# Patient Record
Sex: FEMALE | Race: WHITE | NOT HISPANIC OR LATINO
[De-identification: names, ages, dates, MRNs, and addresses within clinical notes are randomized per-mention and may not be internally consistent; named-entity substitution may affect disease eponyms.]

---

## 2017-12-04 ENCOUNTER — RESULT REVIEW (OUTPATIENT)
Age: 52
End: 2017-12-04

## 2019-04-23 ENCOUNTER — RESULT REVIEW (OUTPATIENT)
Age: 54
End: 2019-04-23

## 2019-05-04 ENCOUNTER — RESULT REVIEW (OUTPATIENT)
Age: 54
End: 2019-05-04

## 2022-04-28 ENCOUNTER — NON-APPOINTMENT (OUTPATIENT)
Age: 57
End: 2022-04-28

## 2022-04-29 PROBLEM — Z00.00 ENCOUNTER FOR PREVENTIVE HEALTH EXAMINATION: Status: ACTIVE | Noted: 2022-04-29

## 2022-05-10 ENCOUNTER — APPOINTMENT (OUTPATIENT)
Dept: SURGICAL ONCOLOGY | Facility: CLINIC | Age: 57
End: 2022-05-10
Payer: COMMERCIAL

## 2022-05-10 VITALS
DIASTOLIC BLOOD PRESSURE: 69 MMHG | SYSTOLIC BLOOD PRESSURE: 114 MMHG | TEMPERATURE: 97.8 F | WEIGHT: 250 LBS | HEIGHT: 66 IN | BODY MASS INDEX: 40.18 KG/M2 | RESPIRATION RATE: 15 BRPM | OXYGEN SATURATION: 98 % | HEART RATE: 68 BPM

## 2022-05-10 DIAGNOSIS — Z80.3 FAMILY HISTORY OF MALIGNANT NEOPLASM OF BREAST: ICD-10-CM

## 2022-05-10 DIAGNOSIS — Z98.890 OTHER SPECIFIED POSTPROCEDURAL STATES: ICD-10-CM

## 2022-05-10 DIAGNOSIS — E78.5 HYPERLIPIDEMIA, UNSPECIFIED: ICD-10-CM

## 2022-05-10 DIAGNOSIS — Z85.820 PERSONAL HISTORY OF MALIGNANT MELANOMA OF SKIN: ICD-10-CM

## 2022-05-10 DIAGNOSIS — Z78.9 OTHER SPECIFIED HEALTH STATUS: ICD-10-CM

## 2022-05-10 DIAGNOSIS — I10 ESSENTIAL (PRIMARY) HYPERTENSION: ICD-10-CM

## 2022-05-10 DIAGNOSIS — Z80.42 FAMILY HISTORY OF MALIGNANT NEOPLASM OF PROSTATE: ICD-10-CM

## 2022-05-10 PROCEDURE — 99205 OFFICE O/P NEW HI 60 MIN: CPT

## 2022-05-10 RX ORDER — OLMESARTAN MEDOXOMIL 40 MG/1
40 TABLET, FILM COATED ORAL
Refills: 0 | Status: ACTIVE | COMMUNITY

## 2022-05-10 RX ORDER — AMLODIPINE BESYLATE 10 MG/1
10 TABLET ORAL
Refills: 0 | Status: ACTIVE | COMMUNITY

## 2022-05-10 RX ORDER — HYDROCHLOROTHIAZIDE 12.5 MG/1
12.5 CAPSULE ORAL
Refills: 0 | Status: ACTIVE | COMMUNITY

## 2022-05-10 RX ORDER — ROSUVASTATIN CALCIUM 5 MG/1
5 TABLET, FILM COATED ORAL
Refills: 0 | Status: ACTIVE | COMMUNITY

## 2022-05-10 NOTE — PHYSICAL EXAM
[Normal] : supple, no neck mass and thyroid not enlarged [Normal Neck Lymph Nodes] : normal neck lymph nodes  [Normal Supraclavicular Lymph Nodes] : normal supraclavicular lymph nodes [Normal Axillary Lymph Nodes] : normal axillary lymph nodes [Normal] : oriented to person, place and time, with appropriate affect [de-identified] : healed lumbar spine shave biopsy site with residual pigmentation; healed right upper back incision (prior melanoma site).

## 2022-05-10 NOTE — ASSESSMENT
[FreeTextEntry1] : Ms. ANNE-MARIE HUMPHREY  is a 56 year  old female who underwent a superior lumbar spine shave bx on 4/21/22 revealing a malignant melanoma, invasive superficial spreading type, Breslow thickness of 0.4 mm, no ulceration, regression present, mitotic index <1/mm2. No perineural invasion. The lesion extends to the peripheral edges and base. Tumor stage: pT1a. \par \par PLAN:\par 1) Wide local excision of lumbar spine T1 melanoma. We discussed the risks, benefits, and alternatives of the procedure with the patient, she expressed understanding and agree to proceed.\par 2) Plastic surgery

## 2022-05-10 NOTE — HISTORY OF PRESENT ILLNESS
[de-identified] : Ms. ANNE-MARIE HUMPHREY  is a 56 year  old female  presenting for an initial consultation for newly diagnosed lumbar spine melanoma, referred by Dr. Prieto Ricks. \par \par The patient states her boyfriend noticed a changing mole on her lower back which prompted her to f/u with dermatology. \par \par Pt. is s/p superior lumbar spine shave bx on 22- PATH:  Malignant melanoma, invasive superficial spreading type, Breslow thickness of 0.4 mm, no ulceration, regression present, mitotic index <1/mm2. No perineural invasion. The lesion extends to the peripheral edges and base. Tumor stage: pT1a. \par \par Ms. HUMPHREY reports a longstanding history of sun exposure without the use of sunblock. History of sunburns.  Denies any other concerning lesions or masses. Denies bleeding or pruritic moles. Denies pain or constitutional symptoms.\par \par PMH: HTN, HLD, right upper back T1 (0.25 mm) melanoma s/p WLE \par PSH: BELLA-BSO (benign), melanoma excision\par Family Hx: Sister with breast cancer, brother with prostate cancer \par Social Hx: Never a smoker, social alcohol use, lives with boyfriend, 1 son (), working. \par MEDS:  Hydrochlorothiazide 12.5 mg daily, Amlodipine 10 mg daily, Rosuvastatin 5 mg daily, Olmesartan  20 mg BID. \par NKDA

## 2022-05-10 NOTE — CONSULT LETTER
[Dear  ___] : Dear  [unfilled], [Consult Letter:] : I had the pleasure of evaluating your patient, [unfilled]. [Please see my note below.] : Please see my note below. [Consult Closing:] : Thank you very much for allowing me to participate in the care of this patient.  If you have any questions, please do not hesitate to contact me. [Sincerely,] : Sincerely, [FreeTextEntry3] : Jesse Crawford MD, MPH, FACS, FSSO\par , [facility] Surgical On[facility]\par [facility]\par Associate Professor of Surgery\par [employer] at [facility]  [DrSue  ___] : Dr. BEAR

## 2022-05-19 ENCOUNTER — OUTPATIENT (OUTPATIENT)
Dept: OUTPATIENT SERVICES | Facility: HOSPITAL | Age: 57
LOS: 1 days | End: 2022-05-19

## 2022-05-19 ENCOUNTER — RESULT REVIEW (OUTPATIENT)
Age: 57
End: 2022-05-19

## 2022-05-19 DIAGNOSIS — C80.1 MALIGNANT (PRIMARY) NEOPLASM, UNSPECIFIED: ICD-10-CM

## 2022-05-31 ENCOUNTER — OUTPATIENT (OUTPATIENT)
Dept: OUTPATIENT SERVICES | Facility: HOSPITAL | Age: 57
LOS: 1 days | End: 2022-05-31
Payer: COMMERCIAL

## 2022-05-31 VITALS
OXYGEN SATURATION: 98 % | SYSTOLIC BLOOD PRESSURE: 119 MMHG | TEMPERATURE: 98 F | HEART RATE: 70 BPM | RESPIRATION RATE: 16 BRPM | DIASTOLIC BLOOD PRESSURE: 60 MMHG | HEIGHT: 67 IN | WEIGHT: 259.93 LBS

## 2022-05-31 DIAGNOSIS — E78.5 HYPERLIPIDEMIA, UNSPECIFIED: ICD-10-CM

## 2022-05-31 DIAGNOSIS — D64.9 ANEMIA, UNSPECIFIED: ICD-10-CM

## 2022-05-31 DIAGNOSIS — Z01.818 ENCOUNTER FOR OTHER PREPROCEDURAL EXAMINATION: ICD-10-CM

## 2022-05-31 DIAGNOSIS — Z29.9 ENCOUNTER FOR PROPHYLACTIC MEASURES, UNSPECIFIED: ICD-10-CM

## 2022-05-31 DIAGNOSIS — E66.9 OBESITY, UNSPECIFIED: ICD-10-CM

## 2022-05-31 DIAGNOSIS — Z90.710 ACQUIRED ABSENCE OF BOTH CERVIX AND UTERUS: Chronic | ICD-10-CM

## 2022-05-31 DIAGNOSIS — Z91.89 OTHER SPECIFIED PERSONAL RISK FACTORS, NOT ELSEWHERE CLASSIFIED: ICD-10-CM

## 2022-05-31 DIAGNOSIS — I10 ESSENTIAL (PRIMARY) HYPERTENSION: ICD-10-CM

## 2022-05-31 DIAGNOSIS — C43.59 MALIGNANT MELANOMA OF OTHER PART OF TRUNK: ICD-10-CM

## 2022-05-31 DIAGNOSIS — Z98.890 OTHER SPECIFIED POSTPROCEDURAL STATES: Chronic | ICD-10-CM

## 2022-05-31 LAB
A1C WITH ESTIMATED AVERAGE GLUCOSE RESULT: 6.1 % — HIGH (ref 4–5.6)
ALBUMIN SERPL ELPH-MCNC: 4.5 G/DL — SIGNIFICANT CHANGE UP (ref 3.3–5.2)
ALP SERPL-CCNC: 66 U/L — SIGNIFICANT CHANGE UP (ref 40–120)
ALT FLD-CCNC: 18 U/L — SIGNIFICANT CHANGE UP
ANION GAP SERPL CALC-SCNC: 11 MMOL/L — SIGNIFICANT CHANGE UP (ref 5–17)
APTT BLD: 32 SEC — SIGNIFICANT CHANGE UP (ref 27.5–35.5)
AST SERPL-CCNC: 17 U/L — SIGNIFICANT CHANGE UP
BASOPHILS # BLD AUTO: 0.06 K/UL — SIGNIFICANT CHANGE UP (ref 0–0.2)
BASOPHILS NFR BLD AUTO: 0.6 % — SIGNIFICANT CHANGE UP (ref 0–2)
BILIRUB SERPL-MCNC: 0.3 MG/DL — LOW (ref 0.4–2)
BLD GP AB SCN SERPL QL: SIGNIFICANT CHANGE UP
BUN SERPL-MCNC: 15.1 MG/DL — SIGNIFICANT CHANGE UP (ref 8–20)
CALCIUM SERPL-MCNC: 9.3 MG/DL — SIGNIFICANT CHANGE UP (ref 8.6–10.2)
CHLORIDE SERPL-SCNC: 102 MMOL/L — SIGNIFICANT CHANGE UP (ref 98–107)
CO2 SERPL-SCNC: 30 MMOL/L — HIGH (ref 22–29)
CREAT SERPL-MCNC: 0.7 MG/DL — SIGNIFICANT CHANGE UP (ref 0.5–1.3)
EGFR: 103 ML/MIN/1.73M2 — SIGNIFICANT CHANGE UP
EOSINOPHIL # BLD AUTO: 0.27 K/UL — SIGNIFICANT CHANGE UP (ref 0–0.5)
EOSINOPHIL NFR BLD AUTO: 2.6 % — SIGNIFICANT CHANGE UP (ref 0–6)
ESTIMATED AVERAGE GLUCOSE: 128 MG/DL — HIGH (ref 68–114)
GLUCOSE SERPL-MCNC: 116 MG/DL — HIGH (ref 70–99)
HCT VFR BLD CALC: 41.5 % — SIGNIFICANT CHANGE UP (ref 34.5–45)
HGB BLD-MCNC: 13.3 G/DL — SIGNIFICANT CHANGE UP (ref 11.5–15.5)
IMM GRANULOCYTES NFR BLD AUTO: 0.3 % — SIGNIFICANT CHANGE UP (ref 0–1.5)
INR BLD: 0.99 RATIO — SIGNIFICANT CHANGE UP (ref 0.88–1.16)
LYMPHOCYTES # BLD AUTO: 1.78 K/UL — SIGNIFICANT CHANGE UP (ref 1–3.3)
LYMPHOCYTES # BLD AUTO: 17.1 % — SIGNIFICANT CHANGE UP (ref 13–44)
MCHC RBC-ENTMCNC: 28.4 PG — SIGNIFICANT CHANGE UP (ref 27–34)
MCHC RBC-ENTMCNC: 32 GM/DL — SIGNIFICANT CHANGE UP (ref 32–36)
MCV RBC AUTO: 88.7 FL — SIGNIFICANT CHANGE UP (ref 80–100)
MONOCYTES # BLD AUTO: 0.63 K/UL — SIGNIFICANT CHANGE UP (ref 0–0.9)
MONOCYTES NFR BLD AUTO: 6 % — SIGNIFICANT CHANGE UP (ref 2–14)
NEUTROPHILS # BLD AUTO: 7.65 K/UL — HIGH (ref 1.8–7.4)
NEUTROPHILS NFR BLD AUTO: 73.4 % — SIGNIFICANT CHANGE UP (ref 43–77)
PLATELET # BLD AUTO: 256 K/UL — SIGNIFICANT CHANGE UP (ref 150–400)
POTASSIUM SERPL-MCNC: 4.1 MMOL/L — SIGNIFICANT CHANGE UP (ref 3.5–5.3)
POTASSIUM SERPL-SCNC: 4.1 MMOL/L — SIGNIFICANT CHANGE UP (ref 3.5–5.3)
PROT SERPL-MCNC: 7.6 G/DL — SIGNIFICANT CHANGE UP (ref 6.6–8.7)
PROTHROM AB SERPL-ACNC: 11.5 SEC — SIGNIFICANT CHANGE UP (ref 10.5–13.4)
RBC # BLD: 4.68 M/UL — SIGNIFICANT CHANGE UP (ref 3.8–5.2)
RBC # FLD: 14.5 % — SIGNIFICANT CHANGE UP (ref 10.3–14.5)
SODIUM SERPL-SCNC: 143 MMOL/L — SIGNIFICANT CHANGE UP (ref 135–145)
WBC # BLD: 10.42 K/UL — SIGNIFICANT CHANGE UP (ref 3.8–10.5)
WBC # FLD AUTO: 10.42 K/UL — SIGNIFICANT CHANGE UP (ref 3.8–10.5)

## 2022-05-31 PROCEDURE — 93005 ELECTROCARDIOGRAM TRACING: CPT

## 2022-05-31 PROCEDURE — 93010 ELECTROCARDIOGRAM REPORT: CPT

## 2022-05-31 PROCEDURE — G0463: CPT

## 2022-05-31 NOTE — H&P PST ADULT - NSANTHOSAYNRD_GEN_A_CORE
No. JOSEE screening performed.  STOP BANG Legend: 0-2 = LOW Risk; 3-4 = INTERMEDIATE Risk; 5-8 = HIGH Risk

## 2022-05-31 NOTE — H&P PST ADULT - SCARS
upper mid back healed scar, right lower lateral back healed scar, left superior lumbar spine erythema and brown spot noted, no s&s infection exudate, bleeding, pain/location

## 2022-05-31 NOTE — H&P PST ADULT - ATTENDING COMMENTS
Risks, benefits, and alternatives discussed with the patient - she expressed understanding and agrees to proceed with wide local excision of superior lumbar spine melanoma.

## 2022-05-31 NOTE — H&P PST ADULT - NSICDXPASTMEDICALHX_GEN_ALL_CORE_FT
PAST MEDICAL HISTORY:  2019 novel coronavirus disease (COVID-19) 12/2020    Anemia     COVID-19 vaccine series completed     Hyperlipidemia     Hypertension     Malignant melanoma of trunk     Obesity      PAST MEDICAL HISTORY:  2019 novel coronavirus disease (COVID-19) 12/2020    Anemia     COVID-19 vaccine series completed     Heavy menstrual bleeding     Hyperlipidemia     Hypertension     Malignant melanoma of trunk     Obesity     S/P skin cancer resection back

## 2022-05-31 NOTE — H&P PST ADULT - PROBLEM SELECTOR PLAN 7
verbal cues Stop Bang = 4. Surgical team to follow. Medical clearance pending, PCP for medical management and follow up as indicated.

## 2022-05-31 NOTE — H&P PST ADULT - LAB RESULTS AND INTERPRETATION
Labs pending Labs pending  5/31/22 10:52 All available labs noted as documented. All abnormal labs noted as documented and have been faxed to PCP with whom medical clearance is pending, T&S pending, A1C added on and pending, spoke to lab. Amy MS, FNP-BC

## 2022-05-31 NOTE — H&P PST ADULT - PROBLEM SELECTOR PLAN 6
Medical clearance pending for  wide local excision of superior lumbar spine melanoma prone position on 6/20/22 with Dr. Jesse Crawford secondary to malignant melanoma of other part of trunk.

## 2022-05-31 NOTE — H&P PST ADULT - ASSESSMENT
57 y/o female presents today pending wide local excision of superior lumbar spine melanoma prone position on 22 with Dr. Jesse Crawford secondary to malignant melanoma of other part of trunk. Pt. with history of skin cancer, states MOHS procedure done 20 years ago on her back. Pt. with history of obesity, HTN, HLD, anemia.  States boyfriend was scratching her back and noticed an abnormal mole on her back, denies pain, states biopsy recently done last month which revealed melanoma pt. states site is itchy as it is healing.  BMI 40.7. BP controlled.    Pt. to have medical clearance with Dr. Galo Glass, pt. aware she is to schedule appt. and pt. verbalized agreement and understanding.   Patient educated on surgical scrub, COVID testing-pt. aware to contact surgeon's office per policy to schedule COVID PCR 3 days pre op. preadmission instructions, medical clearance and day of procedure medications as per policy and pt. verbalizes understanding and agreement.  Pt. educated and instructed regarding all preoperative instructions and education as per policy via both verbal and written means of communication and pt. verbalized agreement and understanding.  Pt instructed to stop vitamins/supplements/herbal medications/NSAIDS for one week prior to surgery and pt. verbalized agreement and understanding.    OPIOID RISK TOOL    ALYSHA EACH BOX THAT APPLIES AND ADD TOTALS AT THE END    FAMILY HISTORY OF SUBSTANCE ABUSE                 FEMALE         MALE                                                Alcohol                             [  ]1 pt          [  ]3pts                                               Illegal Durgs                     [  ]2 pts        [  ]3pts                                               Rx Drugs                           [  ]4 pts        [  ]4 pts    PERSONAL HISTORY OF SUBSTANCE ABUSE                                                                                          Alcohol                             [  ]3 pts       [  ]3 pts                                               Illegal Drugs                     [  ]4 pts        [  ]4 pts                                               Rx Drugs                           [  ]5 pts        [  ]5 pts    AGE BETWEEN 16-45 YEARS                                      [  ]1 pt         [  ]1 pt    HISTORY OF PREADOLESCENT   SEXUAL ABUSE                                                             [  ]3 pts        [  ]0pts    PSYCHOLOGICAL DISEASE                     ADD, OCD, Bipolar, Schizophrenia        [  ]2 pts         [  ]2 pts                      Depression                                               [  ]1 pt           [  ]1 pt           SCORING TOTAL   (add numbers and type here)              (0)                                     A score of 3 or lower indicated LOW risk for future opioid abuse  A score of 4 to 7 indicated moderate risk for future opioid abuse  A score of 8 or higher indicates a high risk for opioid abuse      CAPRINI SCORE    AGE RELATED RISK FACTORS                                                             [ x] Age 41-60 years                                            (1 Point)  [ ] Age: 61-74 years                                           (2 Points)                 [ ] Age= 75 years                                                (3 Points)             DISEASE RELATED RISK FACTORS                                                       [ ] Edema in the lower extremities                 (1 Point)                     [ ] Varicose veins                                               (1 Point)                                 [ x] BMI > 25 Kg/m2                                            (1 Point)                                  [ ] Serious infection (ie PNA)                            (1 Point)                     [ ] Lung disease ( COPD, Emphysema)            (1 Point)                                                                          [ ] Acute myocardial infarction                         (1 Point)                  [ ] Congestive heart failure (in the previous month)  (1 Point)         [ ] Inflammatory bowel disease                            (1 Point)                  [ ] Central venous access, PICC or Port               (2 points)       (within the last month)                                                                [ ] Stroke (in the previous month)                        (5 Points)    [x ] Previous or present malignancy                       (2 points)                                                                                                                                                         HEMATOLOGY RELATED FACTORS                                                         [ ] Prior episodes of VTE                                     (3 Points)                     [ ] Positive family history for VTE                      (3 Points)                  [ ] Prothrombin 96980 A                                     (3 Points)                     [ ] Factor V Leiden                                                (3 Points)                        [ ] Lupus anticoagulants                                      (3 Points)                                                           [ ] Anticardiolipin antibodies                              (3 Points)                                                       [ ] High homocysteine in the blood                   (3 Points)                                             [ ] Other congenital or acquired thrombophilia      (3 Points)                                                [ ] Heparin induced thrombocytopenia                  (3 Points)                                        MOBILITY RELATED FACTORS  [ ] Bed rest                                                         (1 Point)  [ ] Plaster cast                                                    (2 points)  [ ] Bed bound for more than 72 hours           (2 Points)    GENDER SPECIFIC FACTORS  [ ] Pregnancy or had a baby within the last month   (1 Point)  [ ] Post-partum < 6 weeks                                   (1 Point)  [ ] Hormonal therapy  or oral contraception   (1 Point)  [ ] History of pregnancy complications              (1 point)  [ ] Unexplained or recurrent              (1 Point)    OTHER RISK FACTORS                                           (1 Point)  [x ] BMI >40, smoking, diabetes requiring insulin, chemotherapy  blood transfusions and length of surgery over 2 hours    SURGERY RELATED RISK FACTORS  [ ]  Section within the last month     (1 Point)  [ ] Minor surgery                                                  (1 Point)  [ ] Arthroscopic surgery                                       (2 Points)  [x ] Planned major surgery lasting more            (2 Points)      than 45 minutes     [ ] Elective hip or knee joint replacement       (5 points)       surgery                                                TRAUMA RELATED RISK FACTORS  [ ] Fracture of the hip, pelvis, or leg                       (5 Points)  [ ] Spinal cord injury resulting in paralysis             (5 points)       (in the previous month)    [ ] Paralysis  (less than 1 month)                             (5 Points)  [ ] Multiple Trauma within 1 month                        (5 Points)    Total Score [    7    ]    Caprini Score 0-2: Low Risk, NO VTE prophylaxis required for most patients, encourage ambulation  Caprini Score 3-6: Moderate Risk , pharmacologic VTE prophylaxis is indicated for most patients (in the absence of contraindications)  Caprini Score Greater than or =7: High risk, pharmocologic VTE prophylaxis indicated for most patients (in the absence of contraindications)

## 2022-05-31 NOTE — H&P PST ADULT - HISTORY OF PRESENT ILLNESS
55 y/o female presents today pending wide local excision of superior lumbar spine melanoma prone position on 6/20/22 with Dr. Jesse rCawford secondary to malignant melanoma of other part of trunk. Pt. with history of skin cancer, states MOHS procedure done 20 years ago on her back. Pt. with history of obesity, HTN, HLD, anemia.  States boyfriend was scratching her back and noticed an abnormal mole on her back, denies pain, states biopsy recently done last month which revealed melanoma pt. states site is itchy as it is healing.

## 2022-05-31 NOTE — H&P PST ADULT - NSICDXPASTSURGICALHX_GEN_ALL_CORE_FT
PAST SURGICAL HISTORY:  S/P hysterectomy     Status post Mohs surgery back     PAST SURGICAL HISTORY:  S/P hysterectomy 2020    Status post Mohs surgery back

## 2022-05-31 NOTE — H&P PST ADULT - EKG AND INTERPRETATION
EKG Sinus Rhythm with occasional PVCs 69 BPM pending medical clearance and final cardiac interpretation

## 2022-05-31 NOTE — H&P PST ADULT - NSICDXFAMILYHX_GEN_ALL_CORE_FT
FAMILY HISTORY:  Father  Still living? No  FH: heart disease, Age at diagnosis: Age Unknown    Sibling  Still living? No  FH: breast cancer, Age at diagnosis: Age Unknown

## 2022-06-02 LAB — SURGICAL PATHOLOGY STUDY: SIGNIFICANT CHANGE UP

## 2022-06-17 NOTE — ASU PATIENT PROFILE, ADULT - NSICDXPASTMEDICALHX_GEN_ALL_CORE_FT
PAST MEDICAL HISTORY:  2019 novel coronavirus disease (COVID-19) 12/2020    Anemia     COVID-19 vaccine series completed     Heavy menstrual bleeding     Hyperlipidemia     Hypertension     Malignant melanoma of trunk     Obesity     S/P skin cancer resection back

## 2022-06-19 ENCOUNTER — TRANSCRIPTION ENCOUNTER (OUTPATIENT)
Age: 57
End: 2022-06-19

## 2022-06-20 ENCOUNTER — TRANSCRIPTION ENCOUNTER (OUTPATIENT)
Age: 57
End: 2022-06-20

## 2022-06-20 ENCOUNTER — OUTPATIENT (OUTPATIENT)
Dept: INPATIENT UNIT | Facility: HOSPITAL | Age: 57
LOS: 1 days | End: 2022-06-20
Payer: COMMERCIAL

## 2022-06-20 ENCOUNTER — RESULT REVIEW (OUTPATIENT)
Age: 57
End: 2022-06-20

## 2022-06-20 ENCOUNTER — APPOINTMENT (OUTPATIENT)
Dept: SURGICAL ONCOLOGY | Facility: HOSPITAL | Age: 57
End: 2022-06-20

## 2022-06-20 VITALS
DIASTOLIC BLOOD PRESSURE: 69 MMHG | OXYGEN SATURATION: 96 % | RESPIRATION RATE: 16 BRPM | HEART RATE: 69 BPM | SYSTOLIC BLOOD PRESSURE: 127 MMHG | WEIGHT: 259.93 LBS | TEMPERATURE: 99 F | HEIGHT: 67 IN

## 2022-06-20 VITALS
HEART RATE: 53 BPM | DIASTOLIC BLOOD PRESSURE: 55 MMHG | SYSTOLIC BLOOD PRESSURE: 110 MMHG | RESPIRATION RATE: 14 BRPM | OXYGEN SATURATION: 97 % | TEMPERATURE: 98 F

## 2022-06-20 DIAGNOSIS — Z90.710 ACQUIRED ABSENCE OF BOTH CERVIX AND UTERUS: Chronic | ICD-10-CM

## 2022-06-20 DIAGNOSIS — C43.59 MALIGNANT MELANOMA OF OTHER PART OF TRUNK: ICD-10-CM

## 2022-06-20 DIAGNOSIS — Z98.890 OTHER SPECIFIED POSTPROCEDURAL STATES: Chronic | ICD-10-CM

## 2022-06-20 LAB
BLD GP AB SCN SERPL QL: SIGNIFICANT CHANGE UP
SARS-COV-2 N GENE NPH QL NAA+PROBE: NOT DETECTED

## 2022-06-20 PROCEDURE — 86850 RBC ANTIBODY SCREEN: CPT

## 2022-06-20 PROCEDURE — 14302 TIS TRNFR ADDL 30 SQ CM: CPT

## 2022-06-20 PROCEDURE — 36415 COLL VENOUS BLD VENIPUNCTURE: CPT

## 2022-06-20 PROCEDURE — 88305 TISSUE EXAM BY PATHOLOGIST: CPT | Mod: 26

## 2022-06-20 PROCEDURE — 88305 TISSUE EXAM BY PATHOLOGIST: CPT

## 2022-06-20 PROCEDURE — 86901 BLOOD TYPING SEROLOGIC RH(D): CPT

## 2022-06-20 PROCEDURE — 86900 BLOOD TYPING SEROLOGIC ABO: CPT

## 2022-06-20 PROCEDURE — 14301 TIS TRNFR ANY 30.1-60 SQ CM: CPT

## 2022-06-20 PROCEDURE — 11604 EXC TR-EXT MAL+MARG 3.1-4 CM: CPT

## 2022-06-20 DEVICE — CLIP APPLIER COVIDIEN SURGICLIP 11.5" MEDIUM: Type: IMPLANTABLE DEVICE | Status: FUNCTIONAL

## 2022-06-20 RX ORDER — IBUPROFEN 200 MG
1 TABLET ORAL
Qty: 20 | Refills: 0
Start: 2022-06-20 | End: 2022-06-24

## 2022-06-20 RX ORDER — HYDROMORPHONE HYDROCHLORIDE 2 MG/ML
0.5 INJECTION INTRAMUSCULAR; INTRAVENOUS; SUBCUTANEOUS
Refills: 0 | Status: DISCONTINUED | OUTPATIENT
Start: 2022-06-20 | End: 2022-06-20

## 2022-06-20 RX ORDER — AMLODIPINE BESYLATE 2.5 MG/1
1 TABLET ORAL
Qty: 0 | Refills: 0 | DISCHARGE

## 2022-06-20 RX ORDER — SODIUM CHLORIDE 9 MG/ML
1000 INJECTION, SOLUTION INTRAVENOUS
Refills: 0 | Status: DISCONTINUED | OUTPATIENT
Start: 2022-06-20 | End: 2022-06-20

## 2022-06-20 RX ORDER — ROSUVASTATIN CALCIUM 5 MG/1
1 TABLET ORAL
Qty: 0 | Refills: 0 | DISCHARGE

## 2022-06-20 RX ORDER — SODIUM CHLORIDE 9 MG/ML
3 INJECTION INTRAMUSCULAR; INTRAVENOUS; SUBCUTANEOUS ONCE
Refills: 0 | Status: DISCONTINUED | OUTPATIENT
Start: 2022-06-20 | End: 2022-06-20

## 2022-06-20 RX ORDER — FENTANYL CITRATE 50 UG/ML
50 INJECTION INTRAVENOUS
Refills: 0 | Status: DISCONTINUED | OUTPATIENT
Start: 2022-06-20 | End: 2022-06-20

## 2022-06-20 RX ORDER — CEFAZOLIN SODIUM 1 G
2000 VIAL (EA) INJECTION ONCE
Refills: 0 | Status: COMPLETED | OUTPATIENT
Start: 2022-06-20 | End: 2022-06-20

## 2022-06-20 RX ORDER — ACETAMINOPHEN 500 MG
1 TABLET ORAL
Qty: 20 | Refills: 0
Start: 2022-06-20 | End: 2022-06-24

## 2022-06-20 RX ORDER — ACETAMINOPHEN 500 MG
975 TABLET ORAL ONCE
Refills: 0 | Status: COMPLETED | OUTPATIENT
Start: 2022-06-20 | End: 2022-06-20

## 2022-06-20 RX ORDER — OLMESARTAN MEDOXOMIL 5 MG/1
2 TABLET, FILM COATED ORAL
Qty: 0 | Refills: 0 | DISCHARGE

## 2022-06-20 RX ADMIN — Medication 975 MILLIGRAM(S): at 06:37

## 2022-06-20 RX ADMIN — Medication 100 MILLIGRAM(S): at 07:50

## 2022-06-20 NOTE — ASU DISCHARGE PLAN (ADULT/PEDIATRIC) - PROVIDER TOKENS
PROVIDER:[TOKEN:[[id number]],FOLLOWUP:[2 weeks]],PROVIDER:[TOKEN:[[id number]],FOLLOWUP:[2 weeks]]

## 2022-06-20 NOTE — BRIEF OPERATIVE NOTE - OPERATION/FINDINGS
1 cm margins marked, local anesthetic injected, elliptical incision made, specimen extracted using Ligasure, rhomboid flap closure by plastic surgery
Closure of lumbar wound with rhomboid flap 8x10cm

## 2022-06-20 NOTE — ASU PREOP CHECKLIST - SIDE RAILS UP
Agency/Facility Name: [facility]  Outcome: Resent referral via Epic and also manually.     done

## 2022-06-20 NOTE — ASU DISCHARGE PLAN (ADULT/PEDIATRIC) - BRAND OF FIRST COVID-19 BOOSTER
Called, spoke to pt. Two pt identifiers confirmed. Pt informed per Dr. Amirah Barrett anemia stable, normal iron stores. Pt informed per Dr. Amirah Barrett likely anemia related to recent prolonged illness, will monitor  PT voiced interest about most recent metabolic panel. Labs faxed to Dr. Paulina Agosto per pt request.  Pt verbalized understanding of information discussed w/ no further questions at this time.
Let him know anemia stable  Normal iron stores    Likely anemia related to recent prolonged illness    Will monitor
Santa Williamson, labs ordered.
Moderna

## 2022-06-20 NOTE — BRIEF OPERATIVE NOTE - NSICDXBRIEFPREOP_GEN_ALL_CORE_FT
PRE-OP DIAGNOSIS:  Melanoma of back 20-Jun-2022 09:05:38  Arnoldo Ortega A  
PRE-OP DIAGNOSIS:  Melanoma of back 20-Jun-2022 09:05:38  Arnoldo Ortega A

## 2022-06-20 NOTE — BRIEF OPERATIVE NOTE - NSICDXBRIEFPROCEDURE_GEN_ALL_CORE_FT
PROCEDURES:  Excision, neoplasm, malignant, back, 2.1 cm to 3.0 cm in diameter 20-Jun-2022 09:22:26  Aguila Lynn  
PROCEDURES:  Adjacent tissue transfer, trunk, defect 10.1 to 30.0 sq cm 20-Jun-2022 09:05:19  Arnoldo Ortega A

## 2022-06-20 NOTE — ASU DISCHARGE PLAN (ADULT/PEDIATRIC) - CARE PROVIDER_API CALL
Jesse Crawford)  Complex General Surgical Oncology; Surgery; Surgical Oncology  [address] [place] [address]  Phone: [phone]  Fax: [phone]  Follow Up Time: 2 weeks    Arnoldo Ortega)  Plastic Surgery; Surgery of the Hand  [address], [address] [place] [address]  Phone: [phone]  Fax: [phone]  Follow Up Time: 2 weeks

## 2022-06-22 PROBLEM — U07.1 COVID-19: Chronic | Status: ACTIVE | Noted: 2022-05-31

## 2022-06-22 PROBLEM — E78.5 HYPERLIPIDEMIA, UNSPECIFIED: Chronic | Status: ACTIVE | Noted: 2022-05-31

## 2022-06-22 PROBLEM — C43.59 MALIGNANT MELANOMA OF OTHER PART OF TRUNK: Chronic | Status: ACTIVE | Noted: 2022-05-31

## 2022-06-22 PROBLEM — Z92.29 PERSONAL HISTORY OF OTHER DRUG THERAPY: Chronic | Status: ACTIVE | Noted: 2022-05-31

## 2022-06-22 PROBLEM — N92.0 EXCESSIVE AND FREQUENT MENSTRUATION WITH REGULAR CYCLE: Chronic | Status: ACTIVE | Noted: 2022-05-31

## 2022-06-22 PROBLEM — Z98.890 OTHER SPECIFIED POSTPROCEDURAL STATES: Chronic | Status: ACTIVE | Noted: 2022-05-31

## 2022-06-22 PROBLEM — I10 ESSENTIAL (PRIMARY) HYPERTENSION: Chronic | Status: ACTIVE | Noted: 2022-05-31

## 2022-06-22 PROBLEM — E66.9 OBESITY, UNSPECIFIED: Chronic | Status: ACTIVE | Noted: 2022-05-31

## 2022-06-22 PROBLEM — D64.9 ANEMIA, UNSPECIFIED: Chronic | Status: ACTIVE | Noted: 2022-05-31

## 2022-07-06 LAB — SURGICAL PATHOLOGY STUDY: SIGNIFICANT CHANGE UP

## 2022-07-07 ENCOUNTER — APPOINTMENT (OUTPATIENT)
Dept: SURGICAL ONCOLOGY | Facility: CLINIC | Age: 57
End: 2022-07-07

## 2022-07-14 ENCOUNTER — APPOINTMENT (OUTPATIENT)
Dept: SURGICAL ONCOLOGY | Facility: CLINIC | Age: 57
End: 2022-07-14

## 2022-07-14 VITALS
WEIGHT: 265 LBS | DIASTOLIC BLOOD PRESSURE: 79 MMHG | HEIGHT: 67 IN | BODY MASS INDEX: 41.59 KG/M2 | OXYGEN SATURATION: 95 % | SYSTOLIC BLOOD PRESSURE: 118 MMHG | TEMPERATURE: 97.7 F | HEART RATE: 78 BPM

## 2022-07-14 PROCEDURE — 99213 OFFICE O/P EST LOW 20 MIN: CPT

## 2022-07-15 NOTE — CONSULT LETTER
[Dear  ___] : Dear  [unfilled], [Consult Letter:] : I had the pleasure of evaluating your patient, [unfilled]. [Please see my note below.] : Please see my note below. [Consult Closing:] : Thank you very much for allowing me to participate in the care of this patient.  If you have any questions, please do not hesitate to contact me. [Sincerely,] : Sincerely, [FreeTextEntry3] : Jesse Crawford MD, MPH, FACS, FSSO\par , [facility] General Surgical Oncology Fellowship\par [facility]\par Associate Professor of Surgery\par [employer] at [facility]  [DrSue  ___] : Dr. BEAR

## 2022-07-15 NOTE — ASSESSMENT
[FreeTextEntry1] : Ms. ANNE-MARIE HUMPHREY  is a 56 year  old female who underwent a superior lumbar spine shave bx on 4/21/22 revealing a malignant melanoma, invasive superficial spreading type, Breslow thickness of 0.4 mm, no ulceration, regression present, mitotic index <1/mm2. No perineural invasion. The lesion extends to the peripheral edges and base. Tumor stage: pT1a. \par \par She is now status post wide excision of left lumbar back melanoma on 6/20/22.  Plastic reconstruction by Dr. Ortega.   Pathology :Melanoma in situ, residual, margins negative\par \par PLAN:\par 1) Follow up with Derm for full body checks\par 2) RTO in 3 months

## 2022-07-15 NOTE — HISTORY OF PRESENT ILLNESS
[de-identified] : Ms. ANNE-MARIE HUMPHREY  is a 56 year  old female  presenting for an initial postop visit.\par \par She was initially seen in consultation on 5/10/22 for newly diagnosed lumbar spine melanoma, referred by Dr. Prieto Ricks. \par \par The patient states her boyfriend noticed a changing mole on her lower back which prompted her to f/u with dermatology. \par \par Pt. is s/p superior lumbar spine shave bx on 22- PATH:  Malignant melanoma, invasive superficial spreading type, Breslow thickness of 0.4 mm, no ulceration, regression present, mitotic index <1/mm2. No perineural invasion. The lesion extends to the peripheral edges and base. Tumor stage: pT1a. \par \par Ms. HUMPHREY reports a longstanding history of sun exposure without the use of sunblock. History of sunburns.  Denies any other concerning lesions or masses. Denies bleeding or pruritic moles. Denies pain or constitutional symptoms.\par \par PMH: HTN, HLD, right upper back T1 (0.25 mm) melanoma s/p WLE \par PSH: BELLA-BSO (benign), melanoma excision\par Family Hx: Sister with breast cancer, brother with prostate cancer \par Social Hx: Never a smoker, social alcohol use, lives with boyfriend, 1 son (), working. \par MEDS:  Hydrochlorothiazide 12.5 mg daily, Amlodipine 10 mg daily, Rosuvastatin 5 mg daily, Olmesartan  20 mg BID. \par NKDA \par \par \par INTERVAL HISTORY:\par \par ***SURGERY: status post wide excision of left lumbar back melanoma on 22.  Plastic reconstruction by Dr. Ortega\par ***PATHOLOGY:  Melanoma in situ, residual, margins negative\par \par 22- Pt is feeling well, Incision on left back is healed without evidence of infection or recurrence

## 2022-07-15 NOTE — PHYSICAL EXAM
[de-identified] : Back incision is clean, dry, and intact without evidence of infection, seroma, or hematoma

## 2022-10-12 PROBLEM — C43.59 MELANOMA OF BACK: Status: ACTIVE | Noted: 2022-05-10

## 2022-10-13 ENCOUNTER — APPOINTMENT (OUTPATIENT)
Dept: SURGICAL ONCOLOGY | Facility: CLINIC | Age: 57
End: 2022-10-13

## 2022-10-13 VITALS
BODY MASS INDEX: 40.81 KG/M2 | DIASTOLIC BLOOD PRESSURE: 73 MMHG | HEIGHT: 67 IN | RESPIRATION RATE: 16 BRPM | HEART RATE: 63 BPM | SYSTOLIC BLOOD PRESSURE: 140 MMHG | OXYGEN SATURATION: 95 % | TEMPERATURE: 97.4 F | WEIGHT: 260 LBS

## 2022-10-13 DIAGNOSIS — C43.59 MALIGNANT MELANOMA OF OTHER PART OF TRUNK: ICD-10-CM

## 2022-10-13 PROCEDURE — 99213 OFFICE O/P EST LOW 20 MIN: CPT

## 2022-12-23 NOTE — CONSULT LETTER
[Dear  ___] : Dear  [unfilled], [Consult Letter:] : I had the pleasure of evaluating your patient, [unfilled]. [Please see my note below.] : Please see my note below. [Consult Closing:] : Thank you very much for allowing me to participate in the care of this patient.  If you have any questions, please do not hesitate to contact me. [Sincerely,] : Sincerely, [FreeTextEntry3] : Jesse Crawford MD, MPH, FACS, FSSO\par , [facility] General Surgical Oncology Fellowship\par [facility]\par Associate Professor of Surgery\par [employer] at [facility]  [DrSue  ___] : Dr. BEAR

## 2022-12-23 NOTE — REASON FOR VISIT
[Follow-Up Visit] : a follow-up visit for [FreeTextEntry2] : status post wide excision of left lumbar back melanoma on 6/20/22

## 2022-12-23 NOTE — HISTORY OF PRESENT ILLNESS
[de-identified] : Ms. ANNE-MARIE HUMPHREY  is a 56 year  old female  presenting for a follow up visit. \par \par She was initially seen in consultation on 5/10/22 for newly diagnosed lumbar spine melanoma, referred by Dr. Prieto Ricks. \par \par The patient states her boyfriend noticed a changing mole on her lower back which prompted her to f/u with dermatology. \par \par Pt. is s/p superior lumbar spine shave bx on 22- PATH:  Malignant melanoma, invasive superficial spreading type, Breslow thickness of 0.4 mm, no ulceration, regression present, mitotic index <1/mm2. No perineural invasion. The lesion extends to the peripheral edges and base. Tumor stage: pT1a. \par \par Ms. HUMPHREY reports a longstanding history of sun exposure without the use of sunblock. History of sunburns.  Denies any other concerning lesions or masses. Denies bleeding or pruritic moles. Denies pain or constitutional symptoms.\par \par PMH: HTN, HLD, right upper back T1 (0.25 mm) melanoma s/p WLE \par PSH: BELLA-BSO (benign), melanoma excision\par Family Hx: Sister with breast cancer, brother with prostate cancer \par Social Hx: Never a smoker, social alcohol use, lives with boyfriend, 1 son (), working. \par MEDS:  Hydrochlorothiazide 12.5 mg daily, Amlodipine 10 mg daily, Rosuvastatin 5 mg daily, Olmesartan  20 mg BID. \par NKDA \par \par \par INTERVAL HISTORY:\par \par ***SURGERY: status post wide excision of left lumbar back melanoma on 22.  Plastic reconstruction by Dr. Ortega\par ***PATHOLOGY:  Melanoma in situ, residual, margins negative\par \par 10/13/22- Denies any concerning lesions or masses. Denies bleeding or pruritic moles. Denies pain or constitutional changes. Following with Dr. Ricks.

## 2022-12-23 NOTE — PHYSICAL EXAM
[Normal] : supple, no neck mass and thyroid not enlarged [Normal Neck Lymph Nodes] : normal neck lymph nodes  [Normal Supraclavicular Lymph Nodes] : normal supraclavicular lymph nodes [Normal Axillary Lymph Nodes] : normal axillary lymph nodes [Normal] : oriented to person, place and time, with appropriate affect [de-identified] : . [de-identified] : well healed back incision without evidence of recurrence

## 2022-12-23 NOTE — ASSESSMENT
[FreeTextEntry1] : Ms. ANNE-MARIE HUMPHREY  is a 56 year  old female who underwent a superior lumbar spine shave bx on 4/21/22 revealing a malignant melanoma, invasive superficial spreading type, Breslow thickness of 0.4 mm, no ulceration, regression present, mitotic index <1/mm2. No perineural invasion. The lesion extends to the peripheral edges and base. Tumor stage: pT1a. \par \par She is now status post wide excision of left lumbar back melanoma on 6/20/22.  Plastic reconstruction by Dr. Ortega.   Pathology :Melanoma in situ, residual, margins negative\par \par PLAN:\par 1) Follow up with Derm for full body checks\par 2) RTO in 6 months

## 2023-04-13 ENCOUNTER — APPOINTMENT (OUTPATIENT)
Dept: SURGICAL ONCOLOGY | Facility: CLINIC | Age: 58
End: 2023-04-13

## 2023-11-14 NOTE — ASU PREOP CHECKLIST - CHLOROHEXIDINE WASH 3
Spoke with patient to confirm has paper order for upcoming test as noted during scheduling. patient reports does have MD paper order for upcoming CT CAC scan, reminded to bring paper order to test.  
20-Jun-2022

## (undated) DEVICE — SUT SILK 2-0 30" TIES

## (undated) DEVICE — SUT VICRYL PLUS 4-0 18" PS-2 UNDYED

## (undated) DEVICE — SUT MONOCRYL 3-0 27" PS-2 UNDYED

## (undated) DEVICE — LIGASURE SM JAW 16.5MM 18CM

## (undated) DEVICE — DRAPE 3/4 SHEET 52X76"

## (undated) DEVICE — BLANKET WARMER LOWER ADULT

## (undated) DEVICE — DRAPE HALF SHEET 40X57"

## (undated) DEVICE — VESSEL LOOP MINI-BLUE 0.075" X 16"

## (undated) DEVICE — SUT SILK 3-0 30" TIES

## (undated) DEVICE — DRSG MASTISOL

## (undated) DEVICE — SYR CONTROL LUER LOK 10CC

## (undated) DEVICE — BLADE SURGICAL #10 CARBON

## (undated) DEVICE — NDL HYPO REGULAR BEVEL 25G X 1.5"

## (undated) DEVICE — SUT PDS II 2-0 27" CT-2

## (undated) DEVICE — STAPLER SKIN PROXIMATE

## (undated) DEVICE — SUT PDS II 2-0 27" CT-1

## (undated) DEVICE — DRSG XEROFORM 5"

## (undated) DEVICE — SUT VICRYL 0 27" CT-1

## (undated) DEVICE — Device

## (undated) DEVICE — KT PULSAVAC WOUND W/ SPRAYTIP

## (undated) DEVICE — BLANKET WARMER FULL ADULT

## (undated) DEVICE — GLV 7 PROTEXIS

## (undated) DEVICE — SUT VICRYL 4-0 27" PS-2 UNDYED

## (undated) DEVICE — SUT SILK 0 30" SH

## (undated) DEVICE — SUT VICRYL 3-0 27" SH UNDYED

## (undated) DEVICE — WRAP COMPRESSION CALF MED

## (undated) DEVICE — SPONGE PEANUT AUTO COUNT

## (undated) DEVICE — DRAPE TOWEL BLUE 17" X 24"

## (undated) DEVICE — ELCTR GROUNDING PAD ADULT COVIDIEN

## (undated) DEVICE — DRAPE SPLIT SHEETS 77X108"

## (undated) DEVICE — BLADE SURGICAL #15 CARBON

## (undated) DEVICE — GLV 7.5 PROTEXIS

## (undated) DEVICE — ELCTR EDGE BOVIE INSULATED BLADE TIP 2.75"

## (undated) DEVICE — PACK MINOR WITH LAP

## (undated) DEVICE — SUT MONOCRYL 4-0 27" PS-2 UNDYED

## (undated) DEVICE — GOWN XL

## (undated) DEVICE — DRAIN BLAKE 15FR ROUND

## (undated) DEVICE — DRAIN RESERVOIR FOR JACKSON PRATT 100CC CARDINAL